# Patient Record
Sex: MALE | Race: BLACK OR AFRICAN AMERICAN | URBAN - METROPOLITAN AREA
[De-identification: names, ages, dates, MRNs, and addresses within clinical notes are randomized per-mention and may not be internally consistent; named-entity substitution may affect disease eponyms.]

---

## 2017-11-28 ENCOUNTER — HISTORICAL (OUTPATIENT)
Dept: LAB | Facility: HOSPITAL | Age: 47
End: 2017-11-28

## 2017-11-28 LAB
ABS NEUT (OLG): 5.8 X10(3)/MCL (ref 2.1–9.2)
ALBUMIN SERPL-MCNC: 4.2 GM/DL (ref 3.4–5)
ALBUMIN/GLOB SERPL: 1.1 RATIO (ref 1.1–2)
ALP SERPL-CCNC: 116 UNIT/L (ref 46–116)
ALT SERPL-CCNC: 28 UNIT/L (ref 12–78)
AST SERPL-CCNC: 7 UNIT/L (ref 15–37)
BASOPHILS NFR BLD AUTO: 0 % (ref 0–2)
BILIRUB SERPL-MCNC: 1.3 MG/DL (ref 0.2–1)
BILIRUBIN DIRECT+TOT PNL SERPL-MCNC: 0.2 MG/DL (ref 0–0.2)
BILIRUBIN DIRECT+TOT PNL SERPL-MCNC: 1.11 MG/DL (ref 0–0.8)
BUN SERPL-MCNC: 18.6 MG/DL (ref 7–18)
CALCIUM SERPL-MCNC: 10.4 MG/DL (ref 8.5–10.1)
CHLORIDE SERPL-SCNC: 102 MMOL/L (ref 98–107)
CHOLEST SERPL-MCNC: 286 MG/DL (ref 0–200)
CHOLEST/HDLC SERPL: 5.4 {RATIO} (ref 0–5)
CO2 SERPL-SCNC: 27.1 MMOL/L (ref 21–32)
CREAT SERPL-MCNC: 1.32 MG/DL (ref 0.6–1.3)
CREAT UR-MCNC: 413.8 MG/DL (ref 30–125)
EOSINOPHIL NFR BLD AUTO: 0 %
ERYTHROCYTE [DISTWIDTH] IN BLOOD BY AUTOMATED COUNT: 13 % (ref 11.5–17)
EST. AVERAGE GLUCOSE BLD GHB EST-MCNC: 258 MG/DL
GLOBULIN SER-MCNC: 3.9 GM/DL (ref 2.4–3.5)
GLUCOSE SERPL-MCNC: 189 MG/DL (ref 74–106)
HBA1C MFR BLD: 10.6 % (ref 4.5–6.2)
HCT VFR BLD AUTO: 50.4 % (ref 42–52)
HDLC SERPL-MCNC: 53 MG/DL (ref 40–60)
HGB BLD-MCNC: 16.7 GM/DL (ref 14–18)
LDLC SERPL CALC-MCNC: 211 MG/DL (ref 0–129)
LYMPHOCYTES # BLD AUTO: 2.5 X10(3)/MCL
LYMPHOCYTES NFR BLD AUTO: 27 % (ref 13–40)
MCH RBC QN AUTO: 31 PG (ref 27–31)
MCHC RBC AUTO-ENTMCNC: 33.2 GM/DL (ref 33–36)
MCV RBC AUTO: 93.4 FL (ref 80–94)
MICROALBUMIN UR-MCNC: 9.8 MG/DL (ref 0–3)
MICROALBUMIN/CREAT RATIO PNL UR: 23.7 MG/GM CR (ref 0–30)
MONOCYTES # BLD AUTO: 0.7 X10(3)/MCL
MONOCYTES NFR BLD AUTO: 7 % (ref 2–11)
NEUTROPHILS # BLD AUTO: 5.8 X10(3)/MCL (ref 2.1–9.2)
NEUTROPHILS NFR BLD AUTO: 65 % (ref 47–80)
PLATELET # BLD AUTO: 264 X10(3)/MCL (ref 130–400)
PMV BLD AUTO: 8.2 FL (ref 7.4–10.4)
POTASSIUM SERPL-SCNC: 4.1 MMOL/L (ref 3.5–5.1)
PROT SERPL-MCNC: 8.1 GM/DL (ref 6.4–8.2)
PSA SERPL-MCNC: 0.8 NG/ML (ref 0–4)
RBC # BLD AUTO: 5.4 X10(6)/MCL (ref 4.7–6.1)
SODIUM SERPL-SCNC: 139 MMOL/L (ref 136–145)
T4 SERPL-MCNC: 8.6 MCG/DL (ref 4.7–13.3)
TRIGL SERPL-MCNC: 110 MG/DL
TSH SERPL-ACNC: 2.08 MIU/ML (ref 0.36–3.74)
VLDLC SERPL CALC-MCNC: 22 MG/DL
WBC # SPEC AUTO: 9 X10(3)/MCL (ref 4.5–11.5)

## 2019-07-30 ENCOUNTER — HOSPITAL ENCOUNTER (OUTPATIENT)
Dept: MEDSURG UNIT | Facility: HOSPITAL | Age: 49
End: 2019-08-02
Attending: INTERNAL MEDICINE | Admitting: INTERNAL MEDICINE

## 2019-07-30 LAB
ABS NEUT (OLG): 10.17 X10(3)/MCL (ref 2.1–9.2)
BASOPHILS # BLD AUTO: 0.04 X10(3)/MCL (ref 0–0.2)
BASOPHILS NFR BLD AUTO: 0.3 % (ref 0–0.9)
BUN SERPL-MCNC: 9 MG/DL (ref 7–18)
CALCIUM SERPL-MCNC: 8.7 MG/DL (ref 8.5–10.1)
CHLORIDE SERPL-SCNC: 107 MMOL/L (ref 98–107)
CO2 SERPL-SCNC: 24 MMOL/L (ref 21–32)
CREAT SERPL-MCNC: 1.07 MG/DL (ref 0.7–1.3)
CREAT/UREA NIT SERPL: 8
EOSINOPHIL # BLD AUTO: 0.12 X10(3)/MCL (ref 0–0.9)
EOSINOPHIL NFR BLD AUTO: 0.9 % (ref 0–6.5)
ERYTHROCYTE [DISTWIDTH] IN BLOOD BY AUTOMATED COUNT: 12.6 % (ref 11.5–17)
GLUCOSE SERPL-MCNC: 235 MG/DL (ref 74–106)
HCT VFR BLD AUTO: 43.2 % (ref 42–52)
HGB BLD-MCNC: 14.7 GM/DL (ref 14–18)
IMM GRANULOCYTES # BLD AUTO: 0.06 10*3/UL (ref 0–0.02)
IMM GRANULOCYTES NFR BLD AUTO: 0.4 % (ref 0–0.43)
LYMPHOCYTES # BLD AUTO: 2.46 X10(3)/MCL (ref 0.6–4.6)
LYMPHOCYTES NFR BLD AUTO: 18.1 % (ref 16.2–38.3)
MCH RBC QN AUTO: 31.3 PG (ref 27–31)
MCHC RBC AUTO-ENTMCNC: 34 GM/DL (ref 33–36)
MCV RBC AUTO: 92.1 FL (ref 80–94)
MONOCYTES # BLD AUTO: 0.77 X10(3)/MCL (ref 0.1–1.3)
MONOCYTES NFR BLD AUTO: 5.7 % (ref 4.7–11.3)
NEUTROPHILS # BLD AUTO: 10.17 X10(3)/MCL (ref 2.1–9.2)
NEUTROPHILS NFR BLD AUTO: 74.6 % (ref 49.1–73.4)
NRBC BLD AUTO-RTO: 0 % (ref 0–0.2)
PLATELET # BLD AUTO: 333 X10(3)/MCL (ref 130–400)
PMV BLD AUTO: 9.8 FL (ref 7.4–10.4)
POTASSIUM SERPL-SCNC: 3.3 MMOL/L (ref 3.5–5.1)
RBC # BLD AUTO: 4.69 X10(6)/MCL (ref 4.7–6.1)
SODIUM SERPL-SCNC: 139 MMOL/L (ref 136–145)
WBC # SPEC AUTO: 13.6 X10(3)/MCL (ref 4.5–11.5)

## 2019-07-31 LAB
ABS NEUT (OLG): 7.3 X10(3)/MCL (ref 2.1–9.2)
BASOPHILS # BLD AUTO: 0 X10(3)/MCL (ref 0–0.2)
BASOPHILS NFR BLD AUTO: 0 %
BUN SERPL-MCNC: 11 MG/DL (ref 7–18)
CALCIUM SERPL-MCNC: 8.7 MG/DL (ref 8.5–10.1)
CHLORIDE SERPL-SCNC: 111 MMOL/L (ref 98–107)
CO2 SERPL-SCNC: 23 MMOL/L (ref 21–32)
CREAT SERPL-MCNC: 0.96 MG/DL (ref 0.7–1.3)
CREAT/UREA NIT SERPL: 11.5
EOSINOPHIL # BLD AUTO: 0.1 X10(3)/MCL (ref 0–0.9)
EOSINOPHIL NFR BLD AUTO: 1 %
ERYTHROCYTE [DISTWIDTH] IN BLOOD BY AUTOMATED COUNT: 12.8 % (ref 11.5–17)
GLUCOSE SERPL-MCNC: 193 MG/DL (ref 74–106)
HCT VFR BLD AUTO: 40.5 % (ref 42–52)
HGB BLD-MCNC: 12.9 GM/DL (ref 14–18)
LYMPHOCYTES # BLD AUTO: 2.2 X10(3)/MCL (ref 0.6–4.6)
LYMPHOCYTES NFR BLD AUTO: 21 %
MCH RBC QN AUTO: 30.5 PG (ref 27–31)
MCHC RBC AUTO-ENTMCNC: 31.9 GM/DL (ref 33–36)
MCV RBC AUTO: 95.7 FL (ref 80–94)
MONOCYTES # BLD AUTO: 0.6 X10(3)/MCL (ref 0.1–1.3)
MONOCYTES NFR BLD AUTO: 5 %
NEUTROPHILS # BLD AUTO: 7.3 X10(3)/MCL (ref 2.1–9.2)
NEUTROPHILS NFR BLD AUTO: 71 %
PLATELET # BLD AUTO: 345 X10(3)/MCL (ref 130–400)
PMV BLD AUTO: 10.1 FL (ref 9.4–12.4)
POTASSIUM SERPL-SCNC: 4.5 MMOL/L (ref 3.5–5.1)
RBC # BLD AUTO: 4.23 X10(6)/MCL (ref 4.7–6.1)
SODIUM SERPL-SCNC: 141 MMOL/L (ref 136–145)
VANCOMYCIN TROUGH SERPL-MCNC: 10.1 MCG/ML (ref 12–20)
WBC # SPEC AUTO: 10.3 X10(3)/MCL (ref 4.5–11.5)

## 2019-08-01 LAB
ABS NEUT (OLG): 6.33 X10(3)/MCL (ref 2.1–9.2)
ALBUMIN SERPL-MCNC: 2.3 GM/DL (ref 3.4–5)
ALBUMIN/GLOB SERPL: 0.6 {RATIO}
ALP SERPL-CCNC: 102 UNIT/L (ref 50–136)
ALT SERPL-CCNC: 26 UNIT/L (ref 12–78)
AST SERPL-CCNC: 24 UNIT/L (ref 15–37)
BASOPHILS # BLD AUTO: 0 X10(3)/MCL (ref 0–0.2)
BASOPHILS NFR BLD AUTO: 0 %
BILIRUB SERPL-MCNC: 0.3 MG/DL (ref 0.2–1)
BILIRUBIN DIRECT+TOT PNL SERPL-MCNC: 0.1 MG/DL (ref 0–0.2)
BILIRUBIN DIRECT+TOT PNL SERPL-MCNC: 0.2 MG/DL (ref 0–0.8)
BUN SERPL-MCNC: 10 MG/DL (ref 7–18)
CALCIUM SERPL-MCNC: 8.6 MG/DL (ref 8.5–10.1)
CHLORIDE SERPL-SCNC: 110 MMOL/L (ref 98–107)
CO2 SERPL-SCNC: 25 MMOL/L (ref 21–32)
CREAT SERPL-MCNC: 0.94 MG/DL (ref 0.7–1.3)
EOSINOPHIL # BLD AUTO: 0.1 X10(3)/MCL (ref 0–0.9)
EOSINOPHIL NFR BLD AUTO: 1 %
ERYTHROCYTE [DISTWIDTH] IN BLOOD BY AUTOMATED COUNT: 12.8 % (ref 11.5–17)
GLOBULIN SER-MCNC: 3.9 GM/DL (ref 2.4–3.5)
GLUCOSE SERPL-MCNC: 84 MG/DL (ref 74–106)
HCT VFR BLD AUTO: 38.7 % (ref 42–52)
HGB BLD-MCNC: 12.4 GM/DL (ref 14–18)
LYMPHOCYTES # BLD AUTO: 2.2 X10(3)/MCL (ref 0.6–4.6)
LYMPHOCYTES NFR BLD AUTO: 24 %
MAGNESIUM SERPL-MCNC: 2 MG/DL (ref 1.8–2.4)
MCH RBC QN AUTO: 30.8 PG (ref 27–31)
MCHC RBC AUTO-ENTMCNC: 32 GM/DL (ref 33–36)
MCV RBC AUTO: 96.3 FL (ref 80–94)
MONOCYTES # BLD AUTO: 0.4 X10(3)/MCL (ref 0.1–1.3)
MONOCYTES NFR BLD AUTO: 4 %
NEUTROPHILS # BLD AUTO: 6.33 X10(3)/MCL (ref 2.1–9.2)
NEUTROPHILS NFR BLD AUTO: 70 %
PLATELET # BLD AUTO: 349 X10(3)/MCL (ref 130–400)
PMV BLD AUTO: 9.9 FL (ref 9.4–12.4)
POTASSIUM SERPL-SCNC: 3.9 MMOL/L (ref 3.5–5.1)
PROT SERPL-MCNC: 6.2 GM/DL (ref 6.4–8.2)
RBC # BLD AUTO: 4.02 X10(6)/MCL (ref 4.7–6.1)
SODIUM SERPL-SCNC: 140 MMOL/L (ref 136–145)
VANCOMYCIN SERPL-MCNC: 13.6 MCG/ML
WBC # SPEC AUTO: 9.1 X10(3)/MCL (ref 4.5–11.5)

## 2019-08-02 LAB
ABS NEUT (OLG): 6.94 X10(3)/MCL (ref 2.1–9.2)
ALBUMIN SERPL-MCNC: 2.3 GM/DL (ref 3.4–5)
ALBUMIN/GLOB SERPL: 0.6 RATIO (ref 1.1–2)
ALP SERPL-CCNC: 90 UNIT/L (ref 50–136)
ALT SERPL-CCNC: 26 UNIT/L (ref 12–78)
AST SERPL-CCNC: 19 UNIT/L (ref 15–37)
BASOPHILS # BLD AUTO: 0 X10(3)/MCL (ref 0–0.2)
BASOPHILS NFR BLD AUTO: 0 %
BILIRUB SERPL-MCNC: 0.2 MG/DL (ref 0.2–1)
BILIRUBIN DIRECT+TOT PNL SERPL-MCNC: 0.1 MG/DL (ref 0–0.5)
BILIRUBIN DIRECT+TOT PNL SERPL-MCNC: 0.1 MG/DL (ref 0–0.8)
BUN SERPL-MCNC: 8 MG/DL (ref 7–18)
CALCIUM SERPL-MCNC: 8.6 MG/DL (ref 8.5–10.1)
CHLORIDE SERPL-SCNC: 111 MMOL/L (ref 98–107)
CO2 SERPL-SCNC: 25 MMOL/L (ref 21–32)
CREAT SERPL-MCNC: 0.88 MG/DL (ref 0.7–1.3)
EOSINOPHIL # BLD AUTO: 0.2 X10(3)/MCL (ref 0–0.9)
EOSINOPHIL NFR BLD AUTO: 2 %
ERYTHROCYTE [DISTWIDTH] IN BLOOD BY AUTOMATED COUNT: 12.8 % (ref 11.5–17)
GLOBULIN SER-MCNC: 3.8 GM/DL (ref 2.4–3.5)
GLUCOSE SERPL-MCNC: 69 MG/DL (ref 74–106)
HCT VFR BLD AUTO: 38.9 % (ref 42–52)
HGB BLD-MCNC: 12.2 GM/DL (ref 14–18)
LYMPHOCYTES # BLD AUTO: 2.1 X10(3)/MCL (ref 0.6–4.6)
LYMPHOCYTES NFR BLD AUTO: 22 %
MAGNESIUM SERPL-MCNC: 2 MG/DL (ref 1.8–2.4)
MCH RBC QN AUTO: 30.3 PG (ref 27–31)
MCHC RBC AUTO-ENTMCNC: 31.4 GM/DL (ref 33–36)
MCV RBC AUTO: 96.8 FL (ref 80–94)
MONOCYTES # BLD AUTO: 0.3 X10(3)/MCL (ref 0.1–1.3)
MONOCYTES NFR BLD AUTO: 3 %
NEUTROPHILS # BLD AUTO: 6.94 X10(3)/MCL (ref 2.1–9.2)
NEUTROPHILS NFR BLD AUTO: 72 %
PLATELET # BLD AUTO: 334 X10(3)/MCL (ref 130–400)
PMV BLD AUTO: 9.7 FL (ref 9.4–12.4)
POTASSIUM SERPL-SCNC: 3.6 MMOL/L (ref 3.5–5.1)
PROT SERPL-MCNC: 6.1 GM/DL (ref 6.4–8.2)
RBC # BLD AUTO: 4.02 X10(6)/MCL (ref 4.7–6.1)
SODIUM SERPL-SCNC: 144 MMOL/L (ref 136–145)
WBC # SPEC AUTO: 9.6 X10(3)/MCL (ref 4.5–11.5)

## 2019-08-04 LAB
FINAL CULTURE: NORMAL
FINAL CULTURE: NORMAL

## 2022-04-30 NOTE — ED PROVIDER NOTES
Patient:   Selvin Adrian             MRN: [id number]            FIN: [id number]               Age:   49 years     Sex:  Male     :  1970   Associated Diagnoses:   Cutaneous abscess of left hand; Abscess of left thigh; Abscess of left forearm; Abscess of left thigh   Author:   Shawn Mckenna MD      Basic Information   Time seen: Immediately upon arrival.   History source: Patient.   Arrival mode: Private vehicle, walking.   History limitation: None.   Additional information: Chief Complaint from Nursing Triage Note : Chief Complaint   2019 5:34 CDT       Chief Complaint           Here for wound recheck and packing removal placed 19 let leg abscess. Wants p[ain meds  .      History of Present Illness   The patient presents for an abscess re-evaluation.  Previous visit(s) to the emergency department 2 days ago.  Previous treatment: incision and drainage.  Symptoms since visit: pain, redness swelling.  The course/duration of symptoms is constant.  Risk factors consist of diabetes mellitus.  Therapy today: prescription medications including clindamycin.  Associated symptoms: denies fever, denies chills and denies nausea.  Additional history: pt here for packing removal and re-evaluation of abscess x2 and left hand infection. pt has abscess on left thigh that was I&D'd 2 days ago. he also has abscess on left forearm that has started to drain. swelling in left hand is getting worse.        Review of Systems   Constitutional symptoms:  Negative except as documented in HPI.   Skin symptoms:  Negative except as documented in HPI.   Eye symptoms:  Negative except as documented in HPI.   ENMT symptoms:  Negative except as documented in HPI.   Respiratory symptoms:  Negative except as documented in HPI.   Cardiovascular symptoms:  Negative except as documented in HPI.   Gastrointestinal symptoms:  Negative except as documented in HPI.   Genitourinary symptoms:  Negative except as documented in  HPI.   Musculoskeletal symptoms:  Negative except as documented in HPI.   Neurologic symptoms:  Negative except as documented in HPI.   Psychiatric symptoms:  Negative except as documented in HPI.   Endocrine symptoms:  Negative except as documented in HPI.   Hematologic/Lymphatic symptoms:  Negative except as documented in HPI.   Allergy/immunologic symptoms:  Negative except as documented in HPI.             Additional review of systems information: All other systems reviewed and otherwise negative.      Health Status   Allergies:    Allergic Reactions (Selected)  No Known Allergies,    Allergies (1) Active Reaction  No Known Allergies None Documented  .   Medications:  (Selected)   Inpatient Medications  Ordered  Normal Saline Infusion: 1,000 mL, 1,000 mL, IV, 1000 mL/hr, start date 07/30/19 6:36:00 CDT, stop date 07/30/19 6:36:00 CDT, STAT  Vancomycin (for IVPB): 1 gm, IV, Once, Infuse over: 100 minute(s), first dose 07/30/19 6:39:00 CDT, stop date 07/30/19 6:39:00 CDT, STAT  Prescriptions  Prescribed  Victoza 18 mg/3 mL subcutaneous injection: 1.2 mg, Subcutaneous, Daily, # 6 mL, 0 Refill(s)  clindamycin 150 mg oral capsule: 300 mg = 2 cap(s), Oral, q6hr, X 10 day(s), # 80 cap(s), 0 Refill(s)  Documented Medications  Documented  metFORMIN 1000 mg oral tablet: 1000 mg = 1 tab(s), Oral, BID.      Past Medical/ Family/ Social History   Family history:    No family history items have been selected or recorded..   Social history:    Social & Psychosocial Habits    Alcohol  09/12/2014 Risk Assessment: Medium Risk    09/12/2014  Use: Current    Type: Beer    Frequency: Daily    Substance Use  09/12/2014 Risk Assessment: Denies Substance Abuse    Tobacco  09/12/2014 Risk Assessment: Medium Risk    09/12/2014  Use: Current every day smoker    Type: Cigarettes    Tobacco use per day: 5    07/28/2019  Use: 5-9 cigarettes (between 1    Type: Cigarettes    Patient Wants Consult For Cessation Counseling N/A    07/30/2019   Use: 5-9 cigarettes (between 1    Patient Wants Consult For Cessation Counseling No    Abuse/Neglect  07/30/2019  SHX Any signs of abuse or neglect No  .      Physical Examination               Vital Signs   Vital Signs   7/30/2019 5:34 CDT       Temperature Oral          36.4 DegC                             Temperature Oral (calculated)             97.52 DegF                             Peripheral Pulse Rate     80 bpm                             Respiratory Rate          18 br/min                             SpO2                      99 %                             Oxygen Therapy            Room air                             Systolic Blood Pressure   150 mmHg  HI                             Diastolic Blood Pressure  103 mmHg  HI  .   General:  Alert, no acute distress.    Skin:  Warm, dry, intact, left thigh abscess: mild purulent drainage, packing pulled and repacked.  left forearm abscess: now draining.   left hand: redness and swelling over 2nd ,3rd MCP. pustule over 2nd mcp was opened by me with small purulent draiange. no pain with ROM of MCP.    Head:  Atraumatic.   Neck:  Supple.   Eye:  Normal conjunctiva.   Ears, nose, mouth and throat:  Oral mucosa moist.   Cardiovascular:  Regular rate and rhythm, No murmur.    Respiratory:  Lungs are clear to auscultation, respirations are non-labored.    Gastrointestinal:  Soft, Nontender, Non distended, Guarding: Negative, Rebound: Negative.    Musculoskeletal:  Normal ROM, normal strength.    Neurological:  Alert and oriented to person, place, time, and situation, No focal neurological deficit observed.       Medical Decision Making   Differential Diagnosis:  Wound evaluation, wound infection, cellulitis.    Documents reviewed:  Emergency department nurses' notes.      Procedure   Incision and drainage   Consent: Has given verbal consent.    Procedural sedation: None.       Description   Hand: left, dorsal, mcp.   Anesthesia: 1% lidocaine.   Preparation: skin  prepped with betadine.   Technique: aspirated .   Drainage: small amount, purulent.   Patient tolerated: Well.       Impression and Plan   Diagnosis   Cutaneous abscess of left hand (FMD62-VC L02.512)   Abscess of left thigh (LFI45-WK L02.416)   Abscess of left forearm (RFF28-TO L02.414)      Calls-Consults   -  7/30/2019 06:55:00 , Kaylin Russo.    Plan   Condition: Stable.    Disposition: Admit time  7/30/2019 06:50:00, Admit to Inpatient Unit.

## 2022-05-02 NOTE — HISTORICAL [facility] CERNER
This is a historical note converted from Melissa. Formatting and pictures may have been removed.  Please reference Melissa for original formatting and attached multimedia. Chief Complaint  Here for wound recheck and packing removal placed 07/28/19 let leg abscess. Andrea moore[ain meds  History of Present Illness  Pt is a 49 year old AAM who presented to [facility] ED for packing removal and re-evaluation of abscess on left thigh that was I&Dd 2 days ago.?He was?also?found to have?abscesses on left forearm that has started to drain and an abscess on the left hand?which was partly drained by the ED provider.? Cultures were obtained and the pt was started on antibiotics.? He was?very promptly admitted to the hospitalist service and transferred here to [facility] for a plastic surgery?evaluation of?his left hand.  Review of Systems  Constitutional symptoms: ?Negative except as documented in HPI.?  Skin symptoms: ?Negative except as documented in HPI.?  Eye symptoms: ?Negative except as documented in HPI.?  ENMT symptoms: ?Negative except as documented in HPI.?  Respiratory symptoms: ?Negative except as documented in HPI.?  Cardiovascular symptoms: ?Negative except as documented in HPI.?  Gastrointestinal symptoms: ?Negative except as documented in HPI.?  Genitourinary symptoms: ?Negative except as documented in HPI.?  Musculoskeletal symptoms: ?Negative except as documented in HPI.?  Neurologic symptoms: ?Negative except as documented in HPI.?  Psychiatric symptoms: ?Negative except as documented in HPI.?  Endocrine symptoms: ?Negative except as documented in HPI.?  Additional review of systems information:?All other systems reviewed and otherwise negative.  Physical Exam  Vitals & Measurements  T:?36.8? ?C (Oral)? TMIN:?36.4? ?C (Oral)? TMAX:?36.8? ?C (Oral)? HR:?77(Peripheral)? RR:?19? BP:?155/100? SpO2:?94%? WT:?91?kg?  General:?obese AAM in no acute distress  Eye: PERRLA, EOMI, clear conjunctiva, eyelids normal  HEENT:?oropharynx  without erythema/exudate, oropharynx and nasal mucosal surfaces moist  Neck: full range of motion, no thyromegaly or lymphadenopathy  Respiratory:?clear to auscultation bilaterally  Cardiovascular:?regular rate and rhythm  Gastrointestinal:?soft, non-tender, non-distended with normal bowel sounds  Integumentary:  left thigh abscess: mild purulent drainage, packing pulled and repacked in the ED  left forearm abscess: now draining  left hand: redness and swelling over 2nd and 3rd MCP; pustule over 2nd?MCP was incised by ED provider with small amount of purulent drainage; no pain with ROM of MCP  Neurologic: cranial nerves grossly intact, no signs of peripheral neurological deficit, motor/sensory function intact  Psychiatric: anxious  Assessment/Plan  1.?Abscess of left hand excluding fingers and thumb?L02.512  2.?Abscess of left forearm?L02.414  3.?Abscess of left thigh?L02.416  4.?Insulin dependent type 2 diabetes mellitus?E11.9  5.?Uncontrolled diabetes mellitus?E11.65  6.?Primary hypertension?I10  7.?Tobacco abuse?Z72.0  8.?Hypokalemia?E87.6  9.?Morbid obesity?E66.01  ?  Plan:  Continue broad spectrum empiric antibiotics  CBG checks with ISS  Start bedtime Lantus and continue metformin  No need for IVFs  Diabetic diet  Replace potassium  Pain and BP control  Request plastic surgery?evaluation of left hand  ?  ?  PCP: SOLE Smalls  DVT prophylaxis: Lovenox  Code status: full   Problem List/Past Medical History  Ongoing  Insulin dependent type 2 diabetes mellitus  Morbid obesity  Primary hypertension  Tobacco abuse  Procedure/Surgical History  denies   Medications  Inpatient  cloniDINE 0.2 mg oral tablet, 0.2 mg= 1 tab(s), Oral, q2hr, PRN  Dextrose 50% and Water (50 mL vial/syringe), 12.5 gm= 25 mL, IV Push, Once, PRN  Dextrose 50% and Water (50 mL vial/syringe), 12.5 gm= 25 mL, IV Push, As Directed, PRN  Dextrose 50% and Water (50 mL vial/syringe), 25 gm= 50 mL, IV Push, As Directed, PRN  Dextrose 50% in  Water intravenous solution, 12.5 gm= 25 mL, IV Push, As Directed, PRN  Dilaudid 2 mg/mL injectable solution, 1 mg= 0.5 mL, IV Push, q4hr, PRN  glucagon, 1 mg= 1 EA, IM, q10min, PRN  glucagon, 1 mg= 1 EA, IM, q10min, PRN  insulin lispro, 2-14 units, Subcutaneous, As Directed, PRN  Lantus 100 units/mL subcutaneous inj., 25 units= 0.25 mL, Subcutaneous, At Bedtime  Lovenox, 40 mg= 0.4 mL, Subcutaneous, Daily  metFORMIN, 1000 mg= 2 tab(s), Oral, BID  Norco 10 mg-325 mg oral tablet, 1 tab(s), Oral, q4hr, PRN  Norco 5 mg-325 mg oral tablet, 1 tab(s), Oral, q4hr, PRN  potassium chloride 20 mEq oral TABLET extended release, 40 mEq= 2 tab(s), Oral, q4hr  Tylenol Extra Strength, 1000 mg= 2 tab(s), Oral, q6hr, PRN  vancomycin  Vasotec 1.25 mg/mL intravenous solution, 2.5 mg= 2 mL, IV Push, q6hr, PRN  Xanax 0.5 mg oral tablet, 0.5 mg= 1 tab(s), Oral, TID, PRN  Zofran, 4 mg= 2 mL, IV Push, q4hr, PRN  Zosyn 3.375 gm (for IVPB)  Home  clindamycin 150 mg oral capsule, 300 mg= 2 cap(s), Oral, q6hr  metFORMIN 1000 mg oral tablet, 1000 mg= 1 tab(s), Oral, BID  Victoza 18 mg/3 mL subcutaneous injection, 1.2 mg, Subcutaneous, Daily,? ?Not Taking per Prescriber: Pharmacy out of stock  Allergies  No Known Allergies  Social History  Abuse/Neglect  No, 07/30/2019  Alcohol - Medium Risk, 09/12/2014  Current, Beer, Daily, 09/12/2014  Substance Use - Denies Substance Abuse, 09/12/2014  Tobacco - Medium Risk, 09/12/2014  5-9 cigarettes (between 1/4 to 1/2 pack)/day in last 30 days, No, 07/30/2019  5-9 cigarettes (between 1/4 to 1/2 pack)/day in last 30 days, Cigarettes, N/A, 07/28/2019  Current every day smoker, Cigarettes, 5 per day., 09/12/2014  Family History  Acute myocardial infarction.: Father.  Cardiac arrest.: Father.  Heart failure.: Negative: Father.  Primary malignant neoplasm of brain: Mother.  Lab Results  Labs Last 24 Hours?  ?Chemistry? Hematology/Coagulation?   Sodium Lvl: 139 mmol/L (07/30/19 06:53:00) WBC:?13.6 x10(3)/mcL?High  (07/30/19 06:53:00)   Potassium Lvl:?3.3 mmol/L?Low (07/30/19 06:53:00) RBC:?4.69 x10(6)/mcL?Low (07/30/19 06:53:00)   Chloride: 107 mmol/L (07/30/19 06:53:00) Hgb: 14.7 gm/dL (07/30/19 06:53:00)   CO2: 24 mmol/L (07/30/19 06:53:00) Hct: 43.2 % (07/30/19 06:53:00)   Calcium Lvl: 8.7 mg/dL (07/30/19 06:53:00) Platelet: 333 x10(3)/mcL (07/30/19 06:53:00)   Glucose Lvl:?235 mg/dL?High (07/30/19 06:53:00) MCV: 92.1 fL (07/30/19 06:53:00)   BUN: 9 mg/dL (07/30/19 06:53:00) MCH:?31.3 pg?High (07/30/19 06:53:00)   Creatinine: 1.07 mg/dL (07/30/19 06:53:00) MCHC: 34 gm/dL (07/30/19 06:53:00)   BUN/Creat Ratio: 8 (07/30/19 06:53:00) RDW: 12.6 % (07/30/19 06:53:00)   eGFR-AA: >60 (07/30/19 06:53:00) MPV: 9.8 fL (07/30/19 06:53:00)   eGFR-TONY: >60 (07/30/19 06:53:00) Abs Neut:?10.17 x10(3)/mcL?High (07/30/19 06:53:00)    Neutro Auto:?74.6 %?High (07/30/19 06:53:00)    Lymph Auto: 18.1 % (07/30/19 06:53:00)    Mono Auto: 5.7 % (07/30/19 06:53:00)    Eos Auto: 0.9 % (07/30/19 06:53:00)    Abs Eos: 0.12 x10(3)/mcL (07/30/19 06:53:00)    Basophil Auto: 0.3 % (07/30/19 06:53:00)    Abs Neutro:?10.17 x10(3)/mcL?High (07/30/19 06:53:00)    Abs Lymph: 2.46 x10(3)/mcL (07/30/19 06:53:00)    Abs Mono: 0.77 x10(3)/mcL (07/30/19 06:53:00)    Abs Baso: 0.04 x10(3)/mcL (07/30/19 06:53:00)    NRBC%: 0.0 (07/30/19 06:53:00)    IG%: 0.4 % (07/30/19 06:53:00)    IG#:?0.06?High (07/30/19 06:53:00)

## 2022-05-02 NOTE — HISTORICAL [facility] CERNER
This is a historical note converted from Melissa. Formatting and pictures may have been removed.  Please reference Melissa for original formatting and attached multimedia. Chief Complaint  Here for wound recheck and packing removal placed 07/28/19 let leg abscess. Andrea moore[ain meds  Reason for Consultation  Left rotator cuff tear  History of Present Illness  This is a patient that is a poorly controlled diabetic that was admitted for?multiple abscess formations.? He received a CT scan of his left upper extremity that demonstrated a?partial tear of the left rotator cuff tendon. ?He reports that he is been having some?pain in the left shoulder?for couple of months now. ?He has a lot of night pain also some weakness he is noticed in the left shoulder.? He states the pain ranges from mild to moderate.? He describes it as a?sharp pain with certain movements.  Review of Systems  GENERAL: No fevers, chills, unexpected weight loss or gain  MUSCULOSKELETAL: Joint pain, extremity pain  Physical Exam  Vitals & Measurements  T:?37? ?C (Oral)? TMIN:?36.4? ?C (Oral)? TMAX:?37? ?C (Oral)? HR:?71(Peripheral)? RR:?18? BP:?129/85? SpO2:?98%?  General: Well-developed, well-nourished.  Neuro: Alert and oriented x 3.  Psych: Normal mood and affect.  CV: Palpable radial pulses.  Resp: Smooth and unlabored.  Skin: No evidence of focal lesions or trauma.  Hem/Imm/Lymph: No evidence of lymphangitis or adenopathy.  Gait: No trendelenburg gait.  DTR: 2+, no hypo or hyperreflexia.  Coordination and Balance: No tremors or abnormal station.  Shoulder Exam:  No obvious deformity. No medial or lateral scapula winging. Forward flexion to 140 degrees and abduction to 140 degrees. Positive empty can,? positive Whipple, Positive drop arm test, Melendez, impingement, Positive AC joint tenderness. Negative biceps groove tenderness, Positive Morel?s, Yergason?s, Speed test. Negative Apprehension and Relocation test. 4/5 strength, normal skin appearance and  palpable pulses distally. Sensibility normal.  Assessment/Plan  1.?Left rotator cuff tear?M75.102  ?Obviously this patient has a lot more acute issues currently being addressed.? As far as his rotator cuff?tear, he can follow-up as an outpatient. ?There are no current recommendations. ?He can use the arm as tolerated. ?For now?I just recommend continued?pain management.? This patient can follow-up with me as an outpatient?the next?3 to 6 weeks.? Thanks for consult.  2.?Abscess of left hand excluding fingers and thumb?L02.512  3.?Abscess of left forearm?L02.414  4.?Abscess of left thigh?L02.416  5.?Insulin dependent type 2 diabetes mellitus?E11.9  6.?Uncontrolled diabetes mellitus?E11.65  7.?Primary hypertension?I10  8.?Tobacco abuse?Z72.0  9.?Hypokalemia?E87.6  10.?Morbid obesity?E66.01   Problem List/Past Medical History  Ongoing  Insulin dependent type 2 diabetes mellitus  Morbid obesity  Primary hypertension  Tobacco abuse  Historical  No qualifying data  Procedure/Surgical History  Drainage of Left Upper Leg Skin, External Approach (07/28/2019)  Incision and drainage of abscess (eg, carbuncle, suppurative hidradenitis, cutaneous or subcutaneous abscess, cyst, furuncle, or paronychia); complicated or multiple (07/28/2019)  denies   Medications  Inpatient  Colace 100 mg oral capsule, 100 mg= 1 cap(s), Oral, BID, PRN  Dextrose 50% and Water (50 mL vial/syringe), 12.5 gm= 25 mL, IV Push, Once, PRN  Dextrose 50% and Water (50 mL vial/syringe), 12.5 gm= 25 mL, IV Push, As Directed, PRN  Dextrose 50% and Water (50 mL vial/syringe), 25 gm= 50 mL, IV Push, As Directed, PRN  Dextrose 50% in Water intravenous solution, 12.5 gm= 25 mL, IV Push, As Directed, PRN  Dilaudid 2 mg/mL injectable solution, 1 mg= 0.5 mL, IV Push, q4hr, PRN  Dulcolax Laxative 5 mg ORAL enteric coated tablet, 5 mg= 1 tab(s), Oral, Daily, PRN  DuoNeb, 3 mL, NEB, q4hr Resp, PRN  glucagon, 1 mg= 1 EA, IM, q10min, PRN  glucagon, 1 mg= 1 EA, IM, q10min,  PRN  hydrALAZINE 20 mg/mL injectable solution, 10 mg= 0.5 mL, IV Push, q3hr, PRN  insulin lispro, 2-14 units, Subcutaneous, As Directed, PRN  labetalol 5 mg/mL intravenous solution, 10 mg= 2 mL, IV Push, q4hr, PRN  Lantus 100 units/mL subcutaneous inj., 25 units= 0.25 mL, Subcutaneous, At Bedtime  Lidocaine Viscous 2% mucous membrane solution, 5 mL, TOP, QIDACHS, PRN  Lovenox, 40 mg= 0.4 mL, Subcutaneous, Daily  Maalox Max with Simethicone (400/400/40mg per 5 mL) UD Susp, 15 mL, Oral, q4hr, PRN  Magnesium Sulfate 1gm/100ml IVPB (Premix), 1 gm= 100 mL, IV Piggyback, Daily, PRN  Magnesium Sulfate 2gm/50ml IVPB (Premix), 2 gm= 50 mL, IV Piggyback, Daily, PRN  metFORMIN, 1000 mg= 2 tab(s), Oral, BID  Milk of Magnesia, 30 mL, Oral, BID, PRN  nicotine 21 mg/24 hr transdermal film, extended release, 21 mg= 1 patch(es), TD, Daily  Norco 10 mg-325 mg oral tablet, 1 tab(s), Oral, q4hr, PRN  Norco 5 mg-325 mg oral tablet, 1 tab(s), Oral, q4hr, PRN  Phenergan 25 mg Tab, 12.5 mg= 0.5 tab(s), Oral, q4hr, PRN  Restoril, 15 mg= 1 cap(s), Oral, Once a day (at bedtime), PRN  Tessalon Perles, 200 mg= 2 cap(s), Oral, q8hr, PRN  Tylenol 325 mg oral tablet, 325 mg= 1 tab(s), Oral, q4hr, PRN  Tylenol 325 mg oral tablet, 325 mg= 1 tab(s), Oral, q4hr, PRN  vancomycin  Zofran 2 mg/mL injectable solution, 4 mg= 2 mL, IV Push, q4hr, PRN  Zosyn 3.375 gm (for IVPB)  Home  clindamycin 150 mg oral capsule, 300 mg= 2 cap(s), Oral, q6hr  metFORMIN 1000 mg oral tablet, 1000 mg= 1 tab(s), Oral, BID  Victoza 18 mg/3 mL subcutaneous injection, 1.2 mg, Subcutaneous, Daily,? ?Not Taking per Prescriber: Pharmacy out of stock  Allergies  No Known Allergies  Social History  Abuse/Neglect  No, 07/30/2019  Alcohol - Medium Risk, 09/12/2014  Current, Beer, Daily, 09/12/2014  Substance Use - Denies Substance Abuse, 09/12/2014  Tobacco - Medium Risk, 09/12/2014  5-9 cigarettes (between 1/4 to 1/2 pack)/day in last 30 days, No, 07/30/2019  5-9 cigarettes (between  1/4 to 1/2 pack)/day in last 30 days, Cigarettes, N/A, 07/28/2019  Current every day smoker, Cigarettes, 5 per day., 09/12/2014  Family History  Acute myocardial infarction.: Father.  Cardiac arrest.: Father.  Heart failure.: Negative: Father.  Primary malignant neoplasm of brain: Mother.  Lab Results  Test Name Test Result Date/Time   Sodium Lvl 141 mmol/L 07/31/2019 05:25 CDT   Potassium Lvl 4.5 mmol/L 07/31/2019 05:25 CDT   Chloride 111 mmol/L (High) 07/31/2019 05:25 CDT   CO2 23.0 mmol/L 07/31/2019 05:25 CDT   Calcium Lvl 8.7 mg/dL 07/31/2019 05:25 CDT   Glucose Lvl 193 mg/dL (High) 07/31/2019 05:25 CDT   BUN 11.0 mg/dL 07/31/2019 05:25 CDT   Creatinine 0.96 mg/dL 07/31/2019 05:25 CDT   WBC 10.3 x10(3)/mcL 07/31/2019 05:25 CDT   Hgb 12.9 gm/dL (Low) 07/31/2019 05:25 CDT   Hct 40.5 % (Low) 07/31/2019 05:25 CDT   Platelet 345 x10(3)/mcL 07/31/2019 05:25 CDT

## 2022-05-02 NOTE — HISTORICAL [facility] CERNER
This is a historical note converted from [employer]. Formatting and pictures may have been removed.  Please reference [place] for original formatting and attached multimedia. Admit and Discharge Dates  Admit Date: 07/30/2019  Discharge Date: 08/02/2019  Physicians  Attending Physician - Jake EPSTEIN, Aarti  Attending Physician - Reddy EPSTEIN, Torres SANDOVAL  Admitting Physician - Reddy EPSTEIN, Torres SANDOVAL  Consulting Physician - Keisha EPSTEIN, Tej GRIFFITHS  Consulting Physician - Manas EPSTEIN, Toñito R  Primary Care Physician - Mercedez Dawn  Discharge Diagnosis  1.?Left rotator cuff tear?M75.102  2.?Abscess of left hand excluding fingers and thumb?L02.512  3.?Abscess of left forearm?L02.414  4.?Abscess of left thigh?L02.416,?Abscess of left thigh?L02.416  5.?Insulin dependent type 2 diabetes mellitus?E11.9  6.?Uncontrolled diabetes mellitus?E11.65  7.?Primary hypertension?I10  8.?Tobacco abuse?Z72.0  9.?Hypokalemia?E87.6  10.?Morbid obesity?E66.01  Surgical Procedures  No procedures recorded for this visit.  Immunizations  No immunizations recorded for this visit.  Hospital Course  49-year-old -American male with significant history of HTN, chronic tobacco use, type II diabetes mellitus admitted to hospitalist service on 7/30/2019.? Patient originally presented to [facility] ED for packing, removal and reevaluation of abscess on left thigh was drained couple of days back.? He was also found to have an abscess on left forearm which was partly drained by the ED provider.? Cultures were obtained and the patient was initiated on antibiotics and was admitted to hospitalist service for further evaluation and also for recommendations from plastic surgery.? Patient was empirically initiated on Zosyn and vancomycin.? Patient also during this hospital admission diagnosed with a left rotator cuff tear for which orthopedics was consulted and recommended outpatient follow-up in next few weeks.? No interventions as inpatient.?  CT of the upper extremity was done in our to evaluate for possible deep fluid collection which came back negative for abscess/drainable fluid collection.? Patient was evaluated by plastic surgery.? No additional recommendations.? No need for any interventions.? Patient was cleared for discharge from plastic surgery standpoint.? Patient does have uncontrolled type II diabetes mellitus.? He will need to be on insulin long-acting and short-acting.? His A1c is more than 11.? Patient was receiving Lantus while in-house in addition to sliding scale he was discharged on the same-Lantus and Humalog to be used a sliding scale #2.? Printed out a sliding scale was given.? Proper diabetic education was done.? Leukocytosis completely resolved.? Blood pressure improved after initiation of antihypertensives-amlodipine.? Patient was symptomatically stable.? Afebrile.? Given that he was cleared by plastic surgery and orthopedics he was discharged home in stable condition.? Discharge medications per med rec.? He will resume metformin only after a few days, 2-3 days since he got contrast for CT here.? Prescriptions for antihypertensives, insulin and other diabetic supplies given.? Antibiotics-Augmentin and doxycycline to be continued for 7 more days to complete a 10 day course.? All treatment plans discussed with the patient and he voiced understanding to everything explained.? Follow-up with Primary care physician, orthopedics and plastic surgery  Time Spent on discharge  36 minutes  Objective  Vitals & Measurements  T:?36.7? ?C (Oral)? TMIN:?36.6? ?C (Oral)? TMAX:?36.8? ?C (Oral)? HR:?53(Peripheral)? BP:?158/95? SpO2:?98%?  Physical Exam  General: ?Alert and oriented, No acute distress. ?  Eye: ?Pupils are equal, round and reactive to light,?  HENT: ?Normocephalic,?  Neck: ?Supple. ?  Respiratory: ?Lungs are clear to auscultation, Respirations are non-labored. ?  Cardiovascular: ?Normal rate, Regular rhythm, No edema.  ?  Gastrointestinal: ?Soft, Non-tender, Normal bowel sounds  Integumentary: ?Warm,?mild area of erythema?around index MCP  Neurologic: ?Alert, Oriented, no focal deficits  Psychiatric: ?Cooperative,??  ?  ?  ?  Patient Discharge Condition  Improved and stable  Discharge Disposition  Home   Discharge Medication Reconciliation  Prescribed  Misc Prescription (Glucometer)?See Instructions  Misc Prescription (Insulin syringes and needles)?See Instructions  Misc Prescription (Lancets and stripes)?See Instructions  acetaminophen-HYDROcodone (acetaminophen-hydrocodone 325 mg-5 mg oral tablet)?1 tab(s), Oral, q6hr, PRN as needed for pain  amLODIPine (amlodipine 10 mg oral tablet)?10 mg, Oral, Daily  doxycycline (doxycycline hyclate 100 mg oral capsule)?100 mg, Oral, BID  insulin glargine (Lantus 100 units/mL subcutaneous solution)?20 units, Subcutaneous, Daily  insulin lispro (HumaLOG 100 units/mL injectable solution)?See Instructions  Continue  liraglutide (Victoza 18 mg/3 mL subcutaneous injection)?1.2 mg, Subcutaneous, Daily  metFORMIN (metFORMIN 1000 mg oral tablet)?1000 mg, Oral, BID  Discontinue  clindamycin (clindamycin 150 mg oral capsule)?300 mg, Oral, q6hr  Education and Orders Provided  Cellulitis, Adult, Easy-to-Read  Abscess, Easy-to-Read (Custom)  Blood Glucose Monitoring, Adult  Diabetes Mellitus and Food  Diabetes Mellitus and Exercise  Discharge - 08/02/19 11:49:00 CDT, Home, Give all scheduled vaccinations prior to discharge. if bp has improved and make sure insulin covered by insuranceplease give a print out of iss # 2?  Discharge Activity - Activity as Tolerated?  Discharge Diet - Diabetic?  Follow up  Report to Emergency Department if symptoms return or worsen  Toñito Malagon, within 2 to 4 weeks  Tej Valdes, within 1 to 2 weeks  Mercedez Bianchi, within 1 week